# Patient Record
Sex: FEMALE | Race: WHITE | NOT HISPANIC OR LATINO | URBAN - METROPOLITAN AREA
[De-identification: names, ages, dates, MRNs, and addresses within clinical notes are randomized per-mention and may not be internally consistent; named-entity substitution may affect disease eponyms.]

---

## 2017-04-19 ENCOUNTER — GENERIC CONVERSION - ENCOUNTER (OUTPATIENT)
Dept: OTHER | Facility: OTHER | Age: 57
End: 2017-04-19

## 2018-01-10 NOTE — RESULT NOTES
Verified Results  (1) URINE CULTURE [id number] 10:00AM Rondi Running     Test Name Result Flag Reference   CLINICAL REPORT (Report)     Test:        Urine culture  Specimen Type:   Urine  Specimen Date:   8/20/2016 10:00 AM  Result Date:    8/23/2016 2:10 PM  Result Status:   Final result  Resulting Lab:   [facility] [address] [place] [address]            Tel: [phone]      CULTURE                                       ------------------                                   >100,000 cfu/ml Mixed Contaminants X5       Discussion/Summary   Teersa,   Your urine culture on grew contaminants  If you are not feeling better please let me know     Dr Allie Kumar

## 2018-01-15 NOTE — PROGRESS NOTES
Assessment    1  Benign hypertension (401 1) (I10)   2  Mixed hyperlipidemia (272 2) (E78 2)   3  Hyperbilirubinemia (782 4) (E80 6)    Plan  Benign hypertension    · Begin a limited exercise program ; Status:Complete;   Done: 06WNA0121 10:08AM   · Continue with our present treatment plan ; Status:Complete;   Done: 26ZCI9513  10:08AM   · Eat a low fat and low cholesterol diet ; Status:Complete;   Done: 47IJU0130 10:08AM   · We recommend that you bring your body mass index down to 26 ; Status:Complete;    Done: 97QSM0156 10:08AM  Benign hypertension, Mixed hyperlipidemia    · Caduet 10-20 MG Oral Tablet (Amlodipine-Atorvastatin); Take 1 tablet daily  Need for shingles vaccine    · Zostavax 57065 UNT/0 65ML Subcutaneous Solution Reconstituted (Zostavax  58737 UNT/0 65ML Subcutaneous Solution Reconstituted); INJECT 0 65  ML  Subcutaneous    Discussion/Summary    Hypertension and hyperlipidemia are well controlled  We reviewed her labs going back  She has a consistent isolated hyperbilirubinemia with no other LFT abnormalities  This is unchanged and may be related to Gilbert's syndrome  She will continue her medications and follow up in 6-12 months  Chief Complaint  review new labs      History of Present Illness  SHe is here for bp check  This gets high in the office but she checks at home and it is good  Had bloodwork with her cardiologist and her bilirubin is always a little high, other LFT's are normal  She was worried about this  Also recently diagnosed with JAMI  The patient states her hyperlipidemia has been under good control since the last visit  Comorbid Illnesses: hypertension  She has no significant interval events  Symptoms: The patient is currently asymptomatic  Associated symptoms include no focal neurologic deficits and no memory loss  Medications: the patient is adherent with her medication regimen  She denies medication side effects  The patient is doing well with her hyperlipidemia goals  the patient's LDL goal is 100 mg/dL  the patient's last LDL was 73 mg/dL  The patient presents for follow-up of essential hypertension  The patient states she has been doing well with her blood pressure control since the last visit  She has no comorbid illnesses  She has no significant interval events  Symptoms: The patient is currently asymptomatic  Home monitoring: The patient checks her blood pressure regularly  Typical morning blood pressure readings are 999-875 systolic and <17 diastolic  Typical evening blood pressure readings are 091-228 systolic and <68 diastolic  Blood pressure control has been good  Medications: the patient is adherent with her medication regimen  She denies medication side effects  Disease Management: the patient is doing well with her blood pressure goals  Review of Systems    Constitutional: feeling tired  Cardiovascular: No complaints of slow heart rate, no fast heart rate, no chest pain, no palpitations, no leg claudication, no lower extremity edema  Respiratory: No complaints of shortness of breath, no wheezing, no cough, no SOB on exertion, no orthopnea, no PND  Active Problems    1  Encounter for screening for cardiovascular disorders (V81 2) (Z13 6)   2  Encounter for screening mammogram for malignant neoplasm of breast (V76 12)   (Z12 31)   3  Need for influenza vaccination (V04 81) (Z23)   4  Reported Family History Of Heart Disease (V17 4)   5  Screening for diabetes mellitus (DM) (V77 1) (Z13 1)    Past Medical History    The active problems and past medical history were reviewed and updated today  Surgical History    The surgical history was reviewed and updated today  Family History    The family history was reviewed and updated today  Social History    · Never A Smoker  The social history was reviewed and updated today  The social history was reviewed and is unchanged  Current Meds   1   Caduet 10-20 MG Oral Tablet (Amlodipine-Atorvastatin); Take 1 tablet daily; Therapy: (Recorded:12Uud8492) to Recorded   2  Calcium 500 500 MG TABS; 1 Every Day; Therapy: 06CMF5844 to  Requested for: 93QES2678 Recorded   3  Fish Oil 1000 MG Oral Capsule; 1 Every Day; Therapy: 11OHD6371 to  Requested for: 22TWD4744 Recorded   4  Multi-Vitamin TABS; 1 Every Day; Therapy: 07KPI6004 to  Requested for: 71JLO9771 Recorded   5  Vitamin D 1000 UNIT CAPS; 1 Every Day; Therapy: 22FRG6400 to  Requested for: 03KQC8182 Recorded    The medication list was reviewed and updated today  Allergies    1  Penicillins    Vitals  Vital Signs [Data Includes: Current Encounter]    Recorded: 35OTP4568 10:07AM Recorded: 46UMQ9169 09:41AM   Temperature  98 3 F   Heart Rate  84   Respiration  16   Systolic 492    Diastolic 84    Height  5 ft 1 in   Weight  140 lb    BMI Calculated  26 45   BSA Calculated  1 62     Physical Exam    Constitutional   General appearance: No acute distress, well appearing and well nourished  Ears, Nose, Mouth, and Throat   Oropharynx: Normal with no erythema, edema, exudate or lesions  Pulmonary   Respiratory effort: No increased work of breathing or signs of respiratory distress  Auscultation of lungs: Clear to auscultation  Cardiovascular   Auscultation of heart: Normal rate and rhythm, normal S1 and S2, without murmurs  Abdomen   Abdomen: Non-tender, no masses  Liver and spleen: No hepatomegaly or splenomegaly  Lymphatic   Palpation of lymph nodes in neck: No lymphadenopathy           Signatures   Electronically signed by : YUDITH Oden ; Jan 27 2016 10:10AM EST                       (Author)

## 2018-10-04 ENCOUNTER — IMMUNIZATION (OUTPATIENT)
Dept: FAMILY MEDICINE CLINIC | Facility: CLINIC | Age: 58
End: 2018-10-04
Payer: COMMERCIAL

## 2018-10-04 DIAGNOSIS — Z23 ENCOUNTER FOR IMMUNIZATION: ICD-10-CM

## 2018-10-04 PROCEDURE — 90682 RIV4 VACC RECOMBINANT DNA IM: CPT

## 2018-10-04 PROCEDURE — 90471 IMMUNIZATION ADMIN: CPT

## 2019-10-16 NOTE — PROGRESS NOTES
FAMILY PRACTICE HEALTH MAINTENANCE OFFICE VISIT  [facility]    NAME: Cecily Brock  AGE: 63 y o  SEX: female  : 1960     DATE: 10/21/2019    Assessment and Plan     1  Well adult exam    2  Need for influenza vaccination  -     influenza vaccine, quadrivalent, recombinant, PF, 0 5 mL    3  Need for Tdap vaccination  -     TD VACCINE GREATER THAN OR EQUAL TO 8YO PRESERVATIVE FREE IM            · Patient Counseling:   · Nutrition: Stressed importance of a well balanced diet, moderation of sodium/saturated fat, caloric balance and sufficient intake of fiber  · Exercise: Stressed the importance of regular exercise with a goal of 150 minutes per week  · Dental Health: Discussed daily flossing and brushing and regular dental visits   · Immunizations reviewed: See Orders  · Mammogram: Up to date  Pt had mammogram done 2019 at [facility]  Will obtain records  · Cervical Cancer Screening: Pt had pap smear done at her GYN office- Dr Richa Ackerman in [place], [place]  Up to date  Will obtain records  · Screening blood work- pt had routine blood work done 2019 which was ordered by her GYN  Reviewed today  No significant abnormalities other than chronic mildly elevated liver enzymes   BMI Counseling: Body mass index is 25 51 kg/m²  Discussed with patient's BMI with her  The BMI is above normal  Nutrition recommendations include reducing portion sizes, decreasing overall calorie intake and 3-5 servings of fruits/vegetables daily  Exercise recommendations include vigorous aerobic physical activity for 75 minutes/week  No follow-ups on file          Chief Complaint     Chief Complaint   Patient presents with    Physical Exam     [id number]       History of Present Illness     HPI    Well Adult Physical   Patient here for a comprehensive physical exam       Diet and Physical Activity  Diet: well balanced diet  Exercise: daily      Depression Screen  PHQ-9 Depression Screening    PHQ-9:    Frequency of the following problems over the past two weeks:       Little interest or pleasure in doing things:  0 - not at all  Feeling down, depressed, or hopeless:  0 - not at all  PHQ-2 Score:  0          General Health  Hearing: Normal:  bilateral  Vision: no vision problems  Dental: regular dental visits    Reproductive Health  Follows with gynecologist  Dr Quan Lara in [place], [place]    The following portions of the patient's history were reviewed and updated as appropriate: allergies, current medications, past family history, past medical history, past social history, past surgical history and problem list     Review of Systems     Review of Systems   Constitutional: Negative  HENT: Negative  Eyes: Negative  Respiratory: Negative  Cardiovascular: Negative  Gastrointestinal: Negative  Endocrine: Negative  Genitourinary: Negative  Musculoskeletal: Negative  Skin: Negative  Allergic/Immunologic: Negative  Neurological: Negative  Hematological: Negative  Psychiatric/Behavioral: Negative  Past Medical History     No past medical history on file  Past Surgical History     No past surgical history on file      Social History     Social History     Socioeconomic History    Marital status: /Civil Union     Spouse name: None    Number of children: None    Years of education: None    Highest education level: None   Occupational History    None   Social Needs    Financial resource strain: None    Food insecurity:     Worry: None     Inability: None    Transportation needs:     Medical: None     Non-medical: None   Tobacco Use    Smoking status: Never Smoker    Smokeless tobacco: Never Used   Substance and Sexual Activity    Alcohol use: Not Currently    Drug use: Never    Sexual activity: None   Lifestyle    Physical activity:     Days per week: None     Minutes per session: None    Stress: None   Relationships    Social connections:     Talks on phone: None     Gets together: None     Attends Buddhism service: None     Active member of club or organization: None     Attends meetings of clubs or organizations: None     Relationship status: None    Intimate partner violence:     Fear of current or ex partner: None     Emotionally abused: None     Physically abused: None     Forced sexual activity: None   Other Topics Concern    None   Social History Narrative    None       Family History     No family history on file  Current Medications       Current Outpatient Medications:     amLODIPine-atorvastatin (CADUET) 5-10 MG per tablet, Take 1 tablet by mouth daily, Disp: , Rfl:      Allergies     Allergies   Allergen Reactions    Penicillins      Reaction Date: 55YXG8236;        Objective     /90   Pulse 75   Temp (!) 97 2 °F (36 2 °C)   Resp 18   Ht 5' 1" (1 549 m)   Wt 61 2 kg (135 lb)   SpO2 99%   BMI 25 51 kg/m²      Physical Exam   Constitutional: She is oriented to person, place, and time  She appears well-developed and well-nourished  No distress  HENT:   Head: Normocephalic and atraumatic  Right Ear: External ear normal    Left Ear: External ear normal    Mouth/Throat: Oropharynx is clear and moist    Eyes: EOM are normal    Neck: Normal range of motion  Neck supple  No thyromegaly present  Cardiovascular: Normal rate, regular rhythm, normal heart sounds and intact distal pulses  Exam reveals no gallop and no friction rub  No murmur heard  Pulmonary/Chest: Effort normal and breath sounds normal  She has no wheezes  She has no rales  Abdominal: Soft  Bowel sounds are normal  She exhibits no mass  There is no tenderness  There is no rebound  Musculoskeletal: Normal range of motion  She exhibits no deformity  Lymphadenopathy:     She has no cervical adenopathy  Neurological: She is alert and oriented to person, place, and time  She has normal reflexes  She displays normal reflexes   No cranial nerve deficit  She exhibits normal muscle tone  Coordination normal    Skin: Skin is warm and dry  No rash noted  She is not diaphoretic  Psychiatric: She has a normal mood and affect   Her behavior is normal  Judgment and thought content normal           Visual Acuity Screening    Right eye Left eye Both eyes   Without correction:      With correction: 20/25 20/20 20/20           MD MARILIN JonasNSAS [facility]

## 2019-10-21 ENCOUNTER — OFFICE VISIT (OUTPATIENT)
Dept: FAMILY MEDICINE CLINIC | Facility: CLINIC | Age: 59
End: 2019-10-21
Payer: COMMERCIAL

## 2019-10-21 VITALS
RESPIRATION RATE: 18 BRPM | SYSTOLIC BLOOD PRESSURE: 150 MMHG | TEMPERATURE: 97.2 F | HEIGHT: 61 IN | BODY MASS INDEX: 25.49 KG/M2 | WEIGHT: 135 LBS | HEART RATE: 75 BPM | OXYGEN SATURATION: 99 % | DIASTOLIC BLOOD PRESSURE: 90 MMHG

## 2019-10-21 DIAGNOSIS — Z23 NEED FOR TDAP VACCINATION: ICD-10-CM

## 2019-10-21 DIAGNOSIS — Z00.00 WELL ADULT EXAM: Primary | ICD-10-CM

## 2019-10-21 DIAGNOSIS — Z23 NEED FOR INFLUENZA VACCINATION: ICD-10-CM

## 2019-10-21 PROCEDURE — 90472 IMMUNIZATION ADMIN EACH ADD: CPT

## 2019-10-21 PROCEDURE — 99396 PREV VISIT EST AGE 40-64: CPT | Performed by: FAMILY MEDICINE

## 2019-10-21 PROCEDURE — 90471 IMMUNIZATION ADMIN: CPT

## 2019-10-21 PROCEDURE — 90714 TD VACC NO PRESV 7 YRS+ IM: CPT

## 2019-10-21 PROCEDURE — 90682 RIV4 VACC RECOMBINANT DNA IM: CPT

## 2019-10-21 RX ORDER — AMLODIPINE BESYLATE AND ATORVASTATIN CALCIUM 5; 10 MG/1; MG/1
1 TABLET, FILM COATED ORAL DAILY
COMMUNITY

## 2021-03-10 DIAGNOSIS — Z23 ENCOUNTER FOR IMMUNIZATION: ICD-10-CM

## 2021-04-19 ENCOUNTER — TELEPHONE (OUTPATIENT)
Dept: FAMILY MEDICINE CLINIC | Facility: CLINIC | Age: 61
End: 2021-04-19

## 2021-04-19 NOTE — TELEPHONE ENCOUNTER
PT states her bp goes up at every doctor appt  Her cardiologist is asking for the office to prescribe xanax for her upcoming appts which are listed below  The cardiologist states he will not prescribe this for her   Pls advise     June 9th Echo     June 22nd Calcium score       [phone]- Cardiology

## 2021-04-19 NOTE — TELEPHONE ENCOUNTER
Made appointment with Dr Radha Campo for Wednesday 8:30am  No further action needed at this time    Mallory Dela Cruz MA

## 2021-04-21 ENCOUNTER — OFFICE VISIT (OUTPATIENT)
Dept: FAMILY MEDICINE CLINIC | Facility: CLINIC | Age: 61
End: 2021-04-21
Payer: COMMERCIAL

## 2021-04-21 VITALS
TEMPERATURE: 98.3 F | DIASTOLIC BLOOD PRESSURE: 100 MMHG | WEIGHT: 133.6 LBS | HEIGHT: 61 IN | OXYGEN SATURATION: 98 % | BODY MASS INDEX: 25.22 KG/M2 | SYSTOLIC BLOOD PRESSURE: 180 MMHG

## 2021-04-21 DIAGNOSIS — F41.8 SITUATIONAL ANXIETY: Primary | ICD-10-CM

## 2021-04-21 PROCEDURE — 99213 OFFICE O/P EST LOW 20 MIN: CPT | Performed by: FAMILY MEDICINE

## 2021-04-21 RX ORDER — OMEPRAZOLE 10 MG/1
10 CAPSULE, DELAYED RELEASE ORAL DAILY
COMMUNITY

## 2021-04-21 RX ORDER — ALPRAZOLAM 0.25 MG/1
0.25 TABLET ORAL DAILY PRN
Qty: 10 TABLET | Refills: 0 | Status: SHIPPED | OUTPATIENT
Start: 2021-04-21

## 2021-04-21 RX ORDER — AMOXICILLIN 500 MG
1 CAPSULE ORAL DAILY
COMMUNITY

## 2021-04-21 RX ORDER — CARVEDILOL PHOSPHATE 20 MG/1
20 CAPSULE, EXTENDED RELEASE ORAL DAILY
COMMUNITY

## 2021-04-21 NOTE — PROGRESS NOTES
Assessment/Plan:     Diagnoses and all orders for this visit:    Situational anxiety  -     ALPRAZolam (XANAX) 0 25 mg tablet; Take 1 tablet (0 25 mg total) by mouth daily as needed for anxiety Take tablet 30 min prior to doctor appointments or medical procedures  -     Ambulatory referral to Psychiatry; Future- discussed benefits of behavioral therapy  - BP elevated in office at 180/100  She is very anxious  Did provide home BP log and all levels have been within range  She follows cardiology and is on Caduet and Coreg  Subjective:      Patient ID: Rajat Cowan is a 62 y o  female  HPI   Windy Snaabria presents today for evaluation of situational anxiety related to doctor visits and any type of medical procedure  She has had this issue for years, but has recently been worsening  She has significantly elevated blood pressure readings due to this and follows cardiology  She is getting an echo and coronary calcium score testing done in the next couple of months and has follow up appointments with cardiology  Was told by her cardiologist that her tests may be inaccurate if her BP is significantly elevated due to anxiety and recommended that she see her PCP for prn xanax prior to procedure  Her cardiologist is Dr Davonte Qiu at [facility]  She also has appointment coming up with her Ob/GYN  She denies chest pain, HA, dizziness, N/V, vision changes, SOB  The following portions of the patient's history were reviewed and updated as appropriate: allergies, current medications, past family history, past medical history, past social history, past surgical history and problem list     Review of Systems   Constitutional: Negative  HENT: Negative  Eyes: Negative  Respiratory: Negative  Cardiovascular: Negative  Gastrointestinal: Negative  Endocrine: Negative  Genitourinary: Negative  Musculoskeletal: Negative  Skin: Negative  Allergic/Immunologic: Negative      Neurological: Negative  Hematological: Negative  Psychiatric/Behavioral: The patient is nervous/anxious  Objective:      BP (!) 180/100   Temp 98 3 °F (36 8 °C) (Temporal)   Ht 5' 1" (1 549 m)   Wt 60 6 kg (133 lb 9 6 oz)   SpO2 98%   BMI 25 24 kg/m²          Physical Exam  Constitutional:       General: She is not in acute distress  Appearance: She is well-developed  She is not diaphoretic  HENT:      Head: Normocephalic and atraumatic  Neck:      Musculoskeletal: Normal range of motion and neck supple  Cardiovascular:      Rate and Rhythm: Normal rate and regular rhythm  Heart sounds: Normal heart sounds  No murmur  No friction rub  No gallop  Pulmonary:      Effort: Pulmonary effort is normal  No respiratory distress  Breath sounds: Normal breath sounds  No wheezing or rales  Chest:      Chest wall: No tenderness  Musculoskeletal: Normal range of motion  General: No deformity  Skin:     General: Skin is warm and dry  Neurological:      Mental Status: She is alert and oriented to person, place, and time  Psychiatric:         Behavior: Behavior normal          Thought Content:  Thought content normal          Judgment: Judgment normal

## 2021-08-02 DIAGNOSIS — Z12.31 ENCOUNTER FOR SCREENING MAMMOGRAM FOR MALIGNANT NEOPLASM OF BREAST: Primary | ICD-10-CM

## 2021-08-05 ENCOUNTER — TELEPHONE (OUTPATIENT)
Dept: PSYCHIATRY | Facility: CLINIC | Age: 61
End: 2021-08-05

## 2023-03-09 ENCOUNTER — APPOINTMENT (OUTPATIENT)
Dept: RADIOLOGY | Facility: CLINIC | Age: 63
End: 2023-03-09

## 2023-03-09 ENCOUNTER — OFFICE VISIT (OUTPATIENT)
Dept: OBGYN CLINIC | Facility: CLINIC | Age: 63
End: 2023-03-09

## 2023-03-09 VITALS — WEIGHT: 146 LBS | TEMPERATURE: 98.2 F | BODY MASS INDEX: 27.56 KG/M2 | HEIGHT: 61 IN

## 2023-03-09 DIAGNOSIS — M25.561 RIGHT KNEE PAIN, UNSPECIFIED CHRONICITY: ICD-10-CM

## 2023-03-09 DIAGNOSIS — M71.21 BAKER'S CYST OF KNEE, RIGHT: ICD-10-CM

## 2023-03-09 DIAGNOSIS — Z01.89 ENCOUNTER FOR OTHER SPECIFIED SPECIAL EXAMINATIONS: ICD-10-CM

## 2023-03-09 DIAGNOSIS — M17.11 PRIMARY OSTEOARTHRITIS OF RIGHT KNEE: Primary | ICD-10-CM

## 2023-03-09 RX ORDER — MELOXICAM 7.5 MG/1
7.5 TABLET ORAL
Qty: 30 TABLET | Refills: 2 | Status: SHIPPED | OUTPATIENT
Start: 2023-03-09

## 2023-03-09 NOTE — PROGRESS NOTES
Assessment/Plan:  1  Primary osteoarthritis of right knee  Ambulatory referral to Physical Therapy    meloxicam (Mobic) 7 5 mg tablet      2  Baker's cyst of knee, right  Ambulatory referral to Physical Therapy    meloxicam (Mobic) 7 5 mg tablet      3  Right knee pain, unspecified chronicity  XR knee 3 vw right non injury        Scribe Attestation    I,:  Elmer Stevens am acting as a scribe while in the presence of the attending physician :       I,:  Henri Penn,  personally performed the services described in this documentation    as scribed in my presence :         65year old female with right knee mild to moderate medial and patellofemoral compartment osteoarthritis with baker's cyst  An order was placed today for the patient to initiate outpatient Physical Therapy  Home exercises were provided to the patient today as well for the patient to complete if she does not initiate PT  An order was placed today for oral anti-inflammatory to be taken once daily after breakfast   Discussed with the patient to avoid aggravating activities including activities of deep knee flexion, sitting on her flexed knee  May continue participating in work and recreational activities as tolerated using pain, discomfort, "tugging sensation" as a guide for when to modify or discontinue  She may follow up on an as-needed basis  Subjective: initial evaluation of right knee pain    Patient ID: Rudolph Escobar is a 65 y o  female presents today for initial evaluation of right knee pain that began back in January with no known mechanism of injury or trauma  She is active and walks 2-3 miles a day  She denies pain or paraesthesias but notes discomfort in the posterior aspect of her knee  She notes a pulling sensation, occasional clicking sound, and discomfort when she sits with her knee bent, squats down to the floor  Denies previous injury or surgery to the knee         Review of Systems   Constitutional: Negative for chills and fever    HENT: Negative for ear pain and sore throat  Eyes: Negative for pain and visual disturbance  Respiratory: Negative for cough and shortness of breath  Cardiovascular: Negative for chest pain and palpitations  Gastrointestinal: Negative for abdominal pain and vomiting  Genitourinary: Negative for dysuria and hematuria  Musculoskeletal: Positive for arthralgias  Negative for back pain  Skin: Negative for color change and rash  Neurological: Negative for seizures and syncope  All other systems reviewed and are negative  History reviewed  No pertinent past medical history  Past Surgical History:   Procedure Laterality Date   •  SECTION      2       Family History   Problem Relation Age of Onset   • No Known Problems Mother    • No Known Problems Father        Social History     Occupational History   • Not on file   Tobacco Use   • Smoking status: Never   • Smokeless tobacco: Never   Vaping Use   • Vaping Use: Never used   Substance and Sexual Activity   • Alcohol use: Not Currently   • Drug use: Never   • Sexual activity: Not Currently         Current Outpatient Medications:   •  amLODIPine-atorvastatin (CADUET) 5-10 MG per tablet, Take 1 tablet by mouth daily, Disp: , Rfl:   •  Cholecalciferol (VITAMIN D3 PO), Take by mouth, Disp: , Rfl:   •  meloxicam (Mobic) 7 5 mg tablet, Take 1 tablet (7 5 mg total) by mouth daily after breakfast, Disp: 30 tablet, Rfl: 2  •  Omega-3 Fatty Acids (Fish Oil) 1200 MG CAPS, Take 1 capsule by mouth daily, Disp: , Rfl:   •  ALPRAZolam (XANAX) 0 25 mg tablet, Take 1 tablet (0 25 mg total) by mouth daily as needed for anxiety Take tablet 30 min prior to doctor appointments or medical procedures  , Disp: 10 tablet, Rfl: 0  •  Calcium Carbonate Antacid (CALCIUM CARBONATE PO), Take 1 tablet by mouth daily, Disp: , Rfl:   •  carvedilol (COREG CR) 20 MG 24 hr capsule, Take 20 mg by mouth daily, Disp: , Rfl:   •  MULTIPLE VITAMINS-MINERALS PO, Take 1 tablet by mouth daily, Disp: , Rfl:   •  omeprazole (PriLOSEC) 10 mg delayed release capsule, Take 10 mg by mouth daily, Disp: , Rfl:     Allergies   Allergen Reactions   • Other Other (See Comments)     Environmental,Feathers,Mushrooms-  Sneezing,hives   • Penicillins      Reaction Date: [id number];        Objective:  Vitals:    03/09/23 0933   Temp: 98 2 °F (36 8 °C)       Body mass index is 27 59 kg/m²  Right Knee Exam     Muscle Strength   The patient has normal right knee strength  Tenderness   The patient is experiencing no tenderness  Range of Motion   Extension: 0   Flexion: 130     Tests   Felton:  Medial - negative Lateral - negative  Varus: negative Valgus: negative  Lachman:  Anterior - negative    Posterior - negative  Drawer:  Anterior - negative    Posterior - negative  Pivot shift: negative  Patellar apprehension: negative    Other   Erythema: absent  Scars: absent  Sensation: normal  Pulse: present  Effusion: no effusion present    Comments:    - patellar grind  - peripatellar crepitus  Posterior fullness suggestive of Baker's Cyst  Sensation intact in DP/SP/Wilkes/Sa/T nerve distributions  2+ DP & PT pulses  Brisk capillary refill in all toes            Observations     Right Knee   Negative for effusion  Physical Exam  Vitals and nursing note reviewed  Constitutional:       General: She is not in acute distress  Appearance: Normal appearance  HENT:      Head: Normocephalic and atraumatic  Right Ear: External ear normal       Left Ear: External ear normal       Nose: Nose normal    Eyes:      Extraocular Movements: Extraocular movements intact  Conjunctiva/sclera: Conjunctivae normal       Pupils: Pupils are equal, round, and reactive to light  Cardiovascular:      Rate and Rhythm: Normal rate and regular rhythm  Pulses: Normal pulses  Pulmonary:      Effort: Pulmonary effort is normal  No respiratory distress  Breath sounds: Normal breath sounds  Abdominal:      Palpations: Abdomen is soft  Musculoskeletal:      Cervical back: Normal range of motion and neck supple  Right knee: No effusion  Instability Tests: Medial Felton test negative and lateral Felton test negative  Comments: See Ortho Exam   Skin:     General: Skin is warm and dry  Capillary Refill: Capillary refill takes less than 2 seconds  Neurological:      General: No focal deficit present  Mental Status: She is alert and oriented to person, place, and time  Psychiatric:         Mood and Affect: Mood normal          Behavior: Behavior normal          Thought Content: Thought content normal          Judgment: Judgment normal          I have personally reviewed pertinent films in PACS  X-ray of right knee obtained today reviewed and demonstrates: no acute osseous abnormalities  Medial and patellofemoral compartment moderate osteoarthritis with loss of joint space  This document was created using speech voice recognition software  Grammatical errors, random word insertions, pronoun errors, and incomplete sentences are an occasional consequence of this system due to software limitations, ambient noise, and hardware issues  Any formal questions or concerns about content, text, or information contained within the body of this dictation should be directly addressed to the provider for clarification

## 2024-08-01 ENCOUNTER — OFFICE VISIT (OUTPATIENT)
Dept: FAMILY MEDICINE CLINIC | Facility: CLINIC | Age: 64
End: 2024-08-01
Payer: COMMERCIAL

## 2024-08-01 VITALS
HEIGHT: 61 IN | BODY MASS INDEX: 28.7 KG/M2 | HEART RATE: 93 BPM | WEIGHT: 152 LBS | TEMPERATURE: 97.7 F | RESPIRATION RATE: 17 BRPM

## 2024-08-01 DIAGNOSIS — F41.8 SITUATIONAL ANXIETY: ICD-10-CM

## 2024-08-01 DIAGNOSIS — I10 WHITE COAT SYNDROME WITH DIAGNOSIS OF HYPERTENSION: ICD-10-CM

## 2024-08-01 DIAGNOSIS — Z00.00 WELL ADULT EXAM: Primary | ICD-10-CM

## 2024-08-01 DIAGNOSIS — Z13.29 SCREENING FOR THYROID DISORDER: ICD-10-CM

## 2024-08-01 DIAGNOSIS — Z11.4 SCREENING FOR HIV (HUMAN IMMUNODEFICIENCY VIRUS): ICD-10-CM

## 2024-08-01 DIAGNOSIS — Z11.59 NEED FOR HEPATITIS C SCREENING TEST: ICD-10-CM

## 2024-08-01 DIAGNOSIS — G47.33 OBSTRUCTIVE SLEEP APNEA: ICD-10-CM

## 2024-08-01 DIAGNOSIS — Z13.0 SCREENING FOR DEFICIENCY ANEMIA: ICD-10-CM

## 2024-08-01 DIAGNOSIS — E78.2 MIXED HYPERLIPIDEMIA: ICD-10-CM

## 2024-08-01 DIAGNOSIS — R31.9 HEMATURIA, UNSPECIFIED TYPE: ICD-10-CM

## 2024-08-01 DIAGNOSIS — F41.9 ANXIETY: ICD-10-CM

## 2024-08-01 DIAGNOSIS — L60.8 TOENAIL DEFORMITY: ICD-10-CM

## 2024-08-01 DIAGNOSIS — Z78.0 POST-MENOPAUSAL: ICD-10-CM

## 2024-08-01 PROBLEM — M85.80 OSTEOPENIA: Status: ACTIVE | Noted: 2024-08-01

## 2024-08-01 LAB
SL AMB  POCT GLUCOSE, UA: NEGATIVE
SL AMB LEUKOCYTE ESTERASE,UA: NEGATIVE
SL AMB POCT BILIRUBIN,UA: NEGATIVE
SL AMB POCT BLOOD,UA: NORMAL
SL AMB POCT CLARITY,UA: CLEAR
SL AMB POCT COLOR,UA: NORMAL
SL AMB POCT KETONES,UA: NEGATIVE
SL AMB POCT NITRITE,UA: NEGATIVE
SL AMB POCT PH,UA: 6
SL AMB POCT SPECIFIC GRAVITY,UA: 1.01
SL AMB POCT URINE PROTEIN: NEGATIVE
SL AMB POCT UROBILINOGEN: NORMAL

## 2024-08-01 PROCEDURE — 99396 PREV VISIT EST AGE 40-64: CPT | Performed by: FAMILY MEDICINE

## 2024-08-01 PROCEDURE — 81002 URINALYSIS NONAUTO W/O SCOPE: CPT | Performed by: FAMILY MEDICINE

## 2024-08-01 RX ORDER — ALPRAZOLAM 0.5 MG/1
TABLET ORAL
Qty: 10 TABLET | Refills: 0 | Status: SHIPPED | OUTPATIENT
Start: 2024-08-01

## 2024-08-01 RX ORDER — ESOMEPRAZOLE MAGNESIUM 20 MG
1 CAPSULE,DELAYED RELEASE (ENTERIC COATED) ORAL EVERY 24 HOURS
COMMUNITY

## 2024-08-01 NOTE — PROGRESS NOTES
Adult Annual Physical  Name: Teresa Hare      : 1960      MRN: [id number]  Encounter Provider: Edwige Weinstein MD  Encounter Date: 2024   Encounter department: [facility]    Assessment & Plan   1. Well adult exam  2. Need for hepatitis C screening test  -     Hepatitis C Antibody; Future  3. Screening for HIV (human immunodeficiency virus)  -     HIV 1/2 Antigen/Antibody (Fourth Generation) with Reflex Testing ([facility], [facility], or EXTERNAL LAB); Future  4. Screening for deficiency anemia  -     CBC and differential; Future  5. Screening for thyroid disorder  -     TSH, 3rd generation with Free T4 reflex; Future  6. Mixed hyperlipidemia  Assessment & Plan:  Follows cardiologist. On statin. Her cardiologist orders lipid panel.   Orders:  -     Comprehensive metabolic panel; Future  7. Obstructive sleep apnea  Assessment & Plan:  Uses CPAP and follows sleep specialist yearly.   8. White coat syndrome with diagnosis of hypertension  Assessment & Plan:  Follows cardiologist Dr. Willson. Currently on amlodipine 5mg daily. Her BP   9. Hematuria, unspecified type  Assessment & Plan:  She has trace-intact blood on urine dip. States she has had this in the past at GYN appts. No known etiology. For now will monitor. She will repeat urine sample at next GYN appt. If there is still blood, will order ultrasound kidney/bladder.   Orders:  -     POCT urine dip  10. Post-menopausal  -     DXA bone density spine hip and pelvis; Future; Expected date: 2024  11. Situational anxiety  12. Anxiety  -     ALPRAZolam (XANAX) 0.5 mg tablet; Take 1 tab 30 min prior to doctor appointments or procedures for anxiety  13. Toenail deformity  Comments:  1 year history of dark black line on medial aspect of right big toenail. Image below.  Orders:  -     Ambulatory referral to Podiatry; Future    Immunizations and preventive care screenings were discussed with patient today. Appropriate education was printed on patient's  "after visit summary.             History of Present Illness     Adult Annual Physical:  Patient presents for annual physical.     Diet and Physical Activity:  - Diet/Nutrition: well balanced diet.  - Exercise: walking.    Depression Screening:  - PHQ-2 Score: 0    General Health:  - Sleep: sleeps poorly.  - Hearing: normal hearing bilateral ears.  - Vision: goes for regular eye exams, wears contacts and no vision problems.  - Dental: regular dental visits.    /GYN Health:  - Follows with GYN: yes.   - Menopause: postmenopausal.     Review of Systems   Constitutional: Negative.    HENT: Negative.     Eyes: Negative.    Respiratory: Negative.     Cardiovascular: Negative.    Gastrointestinal: Negative.    Endocrine: Negative.    Genitourinary: Negative.    Musculoskeletal: Negative.    Skin: Negative.    Allergic/Immunologic: Negative.    Neurological: Negative.    Hematological: Negative.    Psychiatric/Behavioral: Negative.           Objective     Pulse 93   Temp 97.7 °F (36.5 °C)   Resp 17   Ht 5' 1\" (1.549 m)   Wt 68.9 kg (152 lb)   BMI 28.72 kg/m²     Physical Exam  Vitals and nursing note reviewed.   Constitutional:       General: She is not in acute distress.     Appearance: She is well-developed.   HENT:      Head: Normocephalic and atraumatic.      Right Ear: Tympanic membrane, ear canal and external ear normal. There is no impacted cerumen.      Left Ear: Tympanic membrane, ear canal and external ear normal. There is no impacted cerumen.      Nose: Nose normal.      Mouth/Throat:      Mouth: Mucous membranes are moist.   Eyes:      Conjunctiva/sclera: Conjunctivae normal.   Cardiovascular:      Rate and Rhythm: Normal rate and regular rhythm.      Heart sounds: No murmur heard.  Pulmonary:      Effort: Pulmonary effort is normal. No respiratory distress.      Breath sounds: Normal breath sounds.   Abdominal:      General: Abdomen is flat. There is no distension.      Palpations: Abdomen is soft. There " is no mass.      Tenderness: There is no abdominal tenderness. There is no guarding or rebound.      Hernia: No hernia is present.   Musculoskeletal:         General: Normal range of motion.      Cervical back: Neck supple.   Skin:     General: Skin is warm and dry.      Comments: Right big toenail with an area of black discoloration on the medial aspect.    Neurological:      General: No focal deficit present.      Mental Status: She is alert and oriented to person, place, and time.

## 2024-08-01 NOTE — ASSESSMENT & PLAN NOTE
She has trace-intact blood on urine dip. States she has had this in the past at GYN appts. No known etiology. For now will monitor. She will repeat urine sample at next GYN appt. If there is still blood, will order ultrasound kidney/bladder.

## 2024-08-06 ENCOUNTER — OFFICE VISIT (OUTPATIENT)
Age: 64
End: 2024-08-06
Payer: COMMERCIAL

## 2024-08-06 VITALS — WEIGHT: 152 LBS | HEIGHT: 61 IN | BODY MASS INDEX: 28.7 KG/M2

## 2024-08-06 DIAGNOSIS — L60.8 TOENAIL DEFORMITY: ICD-10-CM

## 2024-08-06 DIAGNOSIS — L60.8 MELANONYCHIA: Primary | ICD-10-CM

## 2024-08-06 PROCEDURE — 88341 IMHCHEM/IMCYTCHM EA ADD ANTB: CPT | Performed by: STUDENT IN AN ORGANIZED HEALTH CARE EDUCATION/TRAINING PROGRAM

## 2024-08-06 PROCEDURE — 88305 TISSUE EXAM BY PATHOLOGIST: CPT | Performed by: STUDENT IN AN ORGANIZED HEALTH CARE EDUCATION/TRAINING PROGRAM

## 2024-08-06 PROCEDURE — 88312 SPECIAL STAINS GROUP 1: CPT | Performed by: STUDENT IN AN ORGANIZED HEALTH CARE EDUCATION/TRAINING PROGRAM

## 2024-08-06 PROCEDURE — 99243 OFF/OP CNSLTJ NEW/EST LOW 30: CPT | Performed by: STUDENT IN AN ORGANIZED HEALTH CARE EDUCATION/TRAINING PROGRAM

## 2024-08-06 PROCEDURE — 88342 IMHCHEM/IMCYTCHM 1ST ANTB: CPT | Performed by: STUDENT IN AN ORGANIZED HEALTH CARE EDUCATION/TRAINING PROGRAM

## 2024-08-08 NOTE — PROGRESS NOTES
"[facility] Office Visit    ASSESSMENT     Diagnoses and all orders for this visit:    Melanonychia  -     Tissue Exam; Future  -     Tissue Exam    Toenail deformity  Comments:  1 year history of dark black line on medial aspect of right big toenail. Image below.  Orders:  -     Ambulatory referral to Podiatry         Problem List Items Addressed This Visit    None  Visit Diagnoses       Melanonychia    -  Primary    Relevant Orders    Tissue Exam    Toenail deformity        1 year history of dark black line on medial aspect of right big toenail. Image below.          PLAN  -Patient was educated regarding their condition  -Biopsy of nail sent for PAS stain to rule out onychomycosis, Kate Larry stain to evaluate for abnormal pigmentation  -Patient understands that if there is abnormal pigmentation/melanin present, she will require a nail matrix biopsy  -RTC if needed pending results of nail biopsy      SUBJECTIVE    Chief Complaint:  Discoloration right big toenail     Patient ID: Teresa Hare     8/6/2024: Teresa is a pleasant 64-year-old female who presents today for evaluation of her toenail discoloration.  She states it has been going on for about a year.  She does not recall injuring it or stubbing it but states that this is possible.  She denies any extreme weight loss or systemic signs of infection.  She denies any family history of skin cancer.        The following portions of the patient's history were reviewed and updated as appropriate: allergies, current medications, past family history, past medical history, past social history, past surgical history and problem list.    Review of Systems   Constitutional: Negative.    HENT: Negative.     Respiratory: Negative.     Cardiovascular: Negative.    Gastrointestinal: Negative.    Musculoskeletal: Negative.    Skin:  Positive for color change.   Neurological: Negative.          OBJECTIVE      Ht 5' 1\" (1.549 m)   Wt 68.9 kg " (152 lb)   BMI 28.72 kg/m²        Physical Exam  Constitutional:       Appearance: Normal appearance.   HENT:      Head: Normocephalic and atraumatic.   Eyes:      General:         Right eye: No discharge.         Left eye: No discharge.   Cardiovascular:      Rate and Rhythm: Normal rate and regular rhythm.      Pulses:           Dorsalis pedis pulses are 2+ on the right side and 2+ on the left side.        Posterior tibial pulses are 2+ on the right side and 2+ on the left side.   Pulmonary:      Effort: Pulmonary effort is normal.      Breath sounds: Normal breath sounds.   Skin:     General: Skin is warm.      Capillary Refill: Capillary refill takes less than 2 seconds.      Comments: Hyperpigmented line measuring approximately 3 mm in diameter noted to the right hallux nail, medial nail border.  No extension of hyperpigmentation into the periungual skin.  Hyperpigmented area is yellow, brown, black in color.   Neurological:      Sensory: Sensation is intact. No sensory deficit.

## 2024-08-09 ENCOUNTER — TELEPHONE (OUTPATIENT)
Age: 64
End: 2024-08-09

## 2024-08-09 PROCEDURE — 88312 SPECIAL STAINS GROUP 1: CPT | Performed by: STUDENT IN AN ORGANIZED HEALTH CARE EDUCATION/TRAINING PROGRAM

## 2024-08-09 PROCEDURE — 88341 IMHCHEM/IMCYTCHM EA ADD ANTB: CPT | Performed by: STUDENT IN AN ORGANIZED HEALTH CARE EDUCATION/TRAINING PROGRAM

## 2024-08-09 PROCEDURE — 88342 IMHCHEM/IMCYTCHM 1ST ANTB: CPT | Performed by: STUDENT IN AN ORGANIZED HEALTH CARE EDUCATION/TRAINING PROGRAM

## 2024-08-09 PROCEDURE — 88305 TISSUE EXAM BY PATHOLOGIST: CPT | Performed by: STUDENT IN AN ORGANIZED HEALTH CARE EDUCATION/TRAINING PROGRAM

## 2024-08-09 NOTE — TELEPHONE ENCOUNTER
Caller: Teresa Hare    Doctor: Leo    Reason for call: Has a question about the nail biopsy.  Can someone  return her call?  Thank you.    Call back#: [phone]

## 2024-09-24 DIAGNOSIS — Z78.0 POST-MENOPAUSAL: ICD-10-CM

## 2024-09-27 ENCOUNTER — IMMUNIZATIONS (OUTPATIENT)
Dept: FAMILY MEDICINE CLINIC | Facility: CLINIC | Age: 64
End: 2024-09-27
Payer: COMMERCIAL

## 2024-09-27 DIAGNOSIS — Z23 FLU VACCINE NEED: Primary | ICD-10-CM

## 2024-09-27 PROCEDURE — 90673 RIV3 VACCINE NO PRESERV IM: CPT

## 2024-09-27 PROCEDURE — 90471 IMMUNIZATION ADMIN: CPT

## 2024-10-31 ENCOUNTER — TELEPHONE (OUTPATIENT)
Age: 64
End: 2024-10-31

## 2024-10-31 NOTE — TELEPHONE ENCOUNTER
Caller: Teresa Hare    Doctor: Dr. Bailey    Reason for call: billing issue/asked me to send email to Admin/mgr so sent to Danielle Carlos to address.    Call back#: [phone]

## 2024-11-05 LAB
HCV AB SERPL QL IA: NORMAL
HIV 1+2 AB+HIV1 P24 AG SERPL QL IA: NORMAL